# Patient Record
Sex: FEMALE | URBAN - METROPOLITAN AREA
[De-identification: names, ages, dates, MRNs, and addresses within clinical notes are randomized per-mention and may not be internally consistent; named-entity substitution may affect disease eponyms.]

---

## 2024-08-02 ENCOUNTER — TELEPHONE (OUTPATIENT)
Age: 59
End: 2024-08-02

## 2024-08-02 ENCOUNTER — TELEPHONE (OUTPATIENT)
Dept: PLASTIC SURGERY | Facility: CLINIC | Age: 59
End: 2024-08-02

## 2024-08-02 NOTE — TELEPHONE ENCOUNTER
Pt stated she has seen Dr. Frazier at [facility] for her tummy tuck/PANN and she is now looking to get her arms done and wants Dr. Frazier to do the sx. Can you please advise and call pt at your earliest convenience, thank you!

## 2024-08-02 NOTE — TELEPHONE ENCOUNTER
Returned call to patient and advised the $150 consult fee for brachioplasty consult and reviewed process. Patient declined at this time.